# Patient Record
Sex: FEMALE | URBAN - NONMETROPOLITAN AREA
[De-identification: names, ages, dates, MRNs, and addresses within clinical notes are randomized per-mention and may not be internally consistent; named-entity substitution may affect disease eponyms.]

---

## 2018-09-13 ENCOUNTER — APPOINTMENT (RX ONLY)
Dept: URBAN - NONMETROPOLITAN AREA CLINIC 35 | Facility: CLINIC | Age: 28
Setting detail: DERMATOLOGY
End: 2018-09-13

## 2018-09-13 DIAGNOSIS — L71.8 OTHER ROSACEA: ICD-10-CM

## 2018-09-13 PROBLEM — L55.1 SUNBURN OF SECOND DEGREE: Status: ACTIVE | Noted: 2018-09-13

## 2018-09-13 PROBLEM — L70.0 ACNE VULGARIS: Status: ACTIVE | Noted: 2018-09-13

## 2018-09-13 PROBLEM — L85.3 XEROSIS CUTIS: Status: ACTIVE | Noted: 2018-09-13

## 2018-09-13 PROBLEM — L30.9 DERMATITIS, UNSPECIFIED: Status: ACTIVE | Noted: 2018-09-13

## 2018-09-13 PROCEDURE — ? TREATMENT REGIMEN

## 2018-09-13 PROCEDURE — ? IN-HOUSE DISPENSING PHARMACY

## 2018-09-13 PROCEDURE — 99201: CPT

## 2018-09-13 PROCEDURE — ? PRESCRIPTION

## 2018-09-13 PROCEDURE — ? OTHER

## 2018-09-13 RX ORDER — DOXYCYCLINE HYCLATE 50 MG/1
CAPSULE, GELATIN COATED ORAL
Qty: 120 | Refills: 1 | Status: CANCELLED
Stop reason: CLARIF

## 2018-09-13 RX ORDER — DOXYCYCLINE HYCLATE 100 MG/1
CAPSULE, GELATIN COATED ORAL
Qty: 60 | Refills: 1 | Status: ERX | COMMUNITY
Start: 2018-09-13

## 2018-09-13 RX ADMIN — DOXYCYCLINE HYCLATE: 100 CAPSULE, GELATIN COATED ORAL at 21:07

## 2018-09-13 ASSESSMENT — LOCATION ZONE DERM
LOCATION ZONE: NECK
LOCATION ZONE: TRUNK
LOCATION ZONE: FACE

## 2018-09-13 ASSESSMENT — LOCATION SIMPLE DESCRIPTION DERM
LOCATION SIMPLE: RIGHT ANTERIOR NECK
LOCATION SIMPLE: LEFT CHEEK
LOCATION SIMPLE: CHEST

## 2018-09-13 ASSESSMENT — LOCATION DETAILED DESCRIPTION DERM
LOCATION DETAILED: UPPER STERNUM
LOCATION DETAILED: LEFT INFERIOR CENTRAL MALAR CHEEK
LOCATION DETAILED: RIGHT INFERIOR ANTERIOR NECK

## 2018-09-13 NOTE — PROCEDURE: TREATMENT REGIMEN
Initiate Treatment: Azelaic acid BID\\nBland moisturizers\\nGentle cleansers \\nZinc SPF\\nNoncomedogenic products
Detail Level: Zone
Plan: Follow up in 6 weeks for evaluation of treatment, sooner prn

## 2018-09-13 NOTE — PROCEDURE: OTHER
Other (Free Text): Dr. Prabhjot Bustillo consulted.  He agrees with the above assessment and treatment plan.
Detail Level: Simple
Note Text (......Xxx Chief Complaint.): This diagnosis correlates with the

## 2018-09-13 NOTE — HPI: RASH
Is This A New Presentation, Or A Follow-Up?: Rash
Additional History: Pt has noticed these bumps off an on for a couple of years. Pt cannot detect any pattern or triggers. Pt thinks they have been more prevalent this summer. Bumps are not itchy and sometimes recede with sun exposure. Pt has an active lesion on the R chin. Pt changed her skincare products recently but changing products did not resolve the rash. \\nPt had a full skin check 2 years ago but does not have time for one today.

## 2018-09-13 NOTE — PROCEDURE: IN-HOUSE DISPENSING PHARMACY
Product 76 Refills: 0
Product 7 Amount/Unit (Numbers Only): 30
Product 55 Unit Type: mg
Product 24 Price/Unit (In Dollars): 45.00
Product 10 Price/Unit (In Dollars): 48.00
Name Of Product 16: Ivermec 1% / Metron 1% Rosacea Gel \\nIvermectin 1%/Metronidazole 1%/Niacinamide 4% /Potassium Azeloyl Diglycinate 5%
Name Of Product 15: Metron 1% Rosacea Gel \\nMetronidazole 1%/Niacinamide 4%
Product 6 Refills: 10
Product 18 Unit Type: ml
Product 4 Amount/Unit (Numbers Only): 60
Product 24 Refills: 3
Product 18 Price/Unit (In Dollars): 40.00
Name Of Product 21: Acne Rosacea Face & Body Cleanser \\nSodium Sulfacetamide 8%/Sulfur 4%
Product 10 Application Directions: Apply 1-2 pumps topically to the affected areas on legs twice daily for 3 weeks, breaking 1 week before repeating again
Product 13 Application Directions: Apply 1-2 pumps topically to affected areas twice daily
Product 22 Refills: 1
Name Of Product 1: Dap 6% Acne Gel\\nDapsone 6%/Niacinamide 4%
Name Of Product 19: Bruise Healing Cream \\nArnica 2%/Ascorbic Acid 20%/Bromelain 5% /Niacinamide 4%/Phytonadione 1%
Product 4 Application Directions: Apply to scalp daily
Product 12 Amount/Unit (Numbers Only): 120
Product 21 Price/Unit (In Dollars): 42.00
Name Of Product 22: AK Imiquim 5% / Levocetir 1% Gel \\nImiquimod 5%/Levocetirizine 1%/Niacinamide 2%
Send Charges To Patient Encounter: Yes
Product 23 Application Directions: Apply to upper lash line nightly
Product 21 Application Directions: Wet skin and apply to the affected areas. Massage gently for 10 seconds, working into a full lather. Rinse well and pat dry. 1-2 x daily.
Product 6 Application Directions: Apply topically to the affected nail(s) and surrounding areas one to two times daily as directed. May precipitate.
Product 12 Refills: 11
Name Of Product 8: Clob 0.05% Topical Solution \\nClobetasol Propionate 0.05%/Niacinamide 4%
Name Of Product 20: Tret 0.05% w/ HA\\nHyaluronic Acid 0.5%/Niacinamide 4%/Tretinoin 0.05%
Name Of Product 7: Anti-Fungal Econaz 1% Cream\\nEconazole Nitrate 1%/Niacinamide 4%
Name Of Product 12: Anti-Fungal Keto 2% Shampoo\\nKetoconazole 2%/Zinc Pyrithione 1%/Salicylic Acid 2%
Product 15 Application Directions: Apply 1-2 pumps topically to the face 1-2 x daily.
Name Of Product 14: Hydrocort 2.5%/Keto 2% Fungal Cream \\nHydrocortisone 2.5%/Ketoconazole 2%
Product 2 Application Directions: Apply 1-2 pumps topically to face every morning
Name Of Product 24: Azelaic Acid Cream\\nAzelaic Acid 15%/Niacinamide 4%
Name Of Product 6: Anti-Fungal Nail Solution \\nCiclopirox 8%/Fluconazole 1%/Terbinafine 1%
Name Of Product 17: Scar Silicone Gel \\nPentoxifylline 0.5%/Tranilast 1%/Triamcinolone Acetonide 0.1%/Zinc Oxide 5%
Name Of Product 2: Dapsone 6%/Niacinamide 2%/Spironolactone 5%
Product 22 Application Directions: Apply 1-2 pumps topically to the scalp twice daily Monday-Friday x 4 weeks.
Name Of Product 9: Clob 0.05% Ointment \\nClobetasol Propionate 0.05%/Niacinamide 4%
Name Of Product 23: Latisse
Name Of Product 3: BP 5% w/ Tret 0.025% Gel\\nBenzoyl Peroxide 5%/Niacinamide 2%/Tretinoin 0.025%
Product 6 Amount/Unit (Numbers Only): 15
Product 12 Application Directions: Apply to the scalp in the shower, letting sit for several minutes before rinsing off
Product 17 Application Directions: Apply 1-2 pumps topically to the affected areas one or two times daily for up to 3 weeks per month.
Detail Level: Simple
Name Of Product 4: Alopecia Topical Solution For Men HP\\nFinasteride 0.1%/Minoxidil 7%
Product 13 Price/Unit (In Dollars): 53.00
Name Of Product 10: Desox 0.025% Ointment\\nDesoximatasone 0.25%/Niacinamide 4%
Product 16 Refills: 7
Name Of Product 5: Alopecia Topical Solution For Women\\nMinoxidil 7%/Progesterone 0.1%
Product 20 Application Directions: Apply topically to face 2-7 nights per week as tolerated
Name Of Product 11: Mometasone 0.1% w/ HA\\nHyaluronic Acid 2%/Mometasone Furoate 0.1% /Niacinamide 4%
Name Of Product 18: Wart Gel \\nCimetidine 5%/Ibuprofen 2%/Salicylic Acid 17%
Product 9 Application Directions: AAA BID for 3 weeks on, 1 week off per month
Product 14 Application Directions: AAA 1-2 x daily
Product 18 Application Directions: Apply 1-2 pumps topically to affected areas nightly
Product 9 Unit Type: grams
Product 16 Application Directions: Apply 1-2 pumps topically to the face every night
Product 7 Application Directions: Apply to both feet and in between toes qhs
Product 24 Application Directions: Apply 1-2 pumps topically to affected areas on face twice daily
Name Of Product 13: Tacro 0.1% Ointment\\nNiacinamide 4%/Tacrolimus 0.1%
Product 5 Application Directions: Apply topically to the affected areas one time daily

## 2018-09-24 ENCOUNTER — RX ONLY (OUTPATIENT)
Age: 28
Setting detail: RX ONLY
End: 2018-09-24

## 2018-09-24 RX ORDER — FLUCONAZOLE 150 MG/1
TABLET ORAL
Qty: 2 | Refills: 0 | Status: CANCELLED

## 2018-09-25 ENCOUNTER — RX ONLY (OUTPATIENT)
Age: 28
Setting detail: RX ONLY
End: 2018-09-25

## 2018-09-25 RX ORDER — DOXYCYCLINE HYCLATE 20 MG/1
TABLET, FILM COATED ORAL
Qty: 60 | Refills: 1 | Status: ERX | COMMUNITY
Start: 2018-09-25

## 2018-11-05 ENCOUNTER — APPOINTMENT (RX ONLY)
Dept: URBAN - NONMETROPOLITAN AREA CLINIC 31 | Facility: CLINIC | Age: 28
Setting detail: DERMATOLOGY
End: 2018-11-05

## 2018-11-05 DIAGNOSIS — Q828 OTHER SPECIFIED ANOMALIES OF SKIN: ICD-10-CM

## 2018-11-05 DIAGNOSIS — Q826 OTHER SPECIFIED ANOMALIES OF SKIN: ICD-10-CM

## 2018-11-05 DIAGNOSIS — B36.8 OTHER SPECIFIED SUPERFICIAL MYCOSES: ICD-10-CM

## 2018-11-05 DIAGNOSIS — D22 MELANOCYTIC NEVI: ICD-10-CM

## 2018-11-05 DIAGNOSIS — Q819 OTHER SPECIFIED ANOMALIES OF SKIN: ICD-10-CM

## 2018-11-05 PROBLEM — L70.0 ACNE VULGARIS: Status: ACTIVE | Noted: 2018-11-05

## 2018-11-05 PROBLEM — D22.9 MELANOCYTIC NEVI, UNSPECIFIED: Status: ACTIVE | Noted: 2018-11-05

## 2018-11-05 PROBLEM — Q82.8 OTHER SPECIFIED CONGENITAL MALFORMATIONS OF SKIN: Status: ACTIVE | Noted: 2018-11-05

## 2018-11-05 PROCEDURE — ? COUNSELING

## 2018-11-05 PROCEDURE — ? PRESCRIPTION

## 2018-11-05 PROCEDURE — 99202 OFFICE O/P NEW SF 15 MIN: CPT

## 2018-11-05 RX ORDER — ECONAZOLE NITRATE 10 MG/G
CREAM TOPICAL
Qty: 1 | Refills: 0 | Status: ERX | COMMUNITY
Start: 2018-11-05

## 2018-11-05 RX ORDER — FLUCONAZOLE 150 MG/1
TABLET ORAL
Qty: 30 | Refills: 0 | Status: ERX | COMMUNITY
Start: 2018-11-05

## 2018-11-05 RX ADMIN — ECONAZOLE NITRATE: 10 CREAM TOPICAL at 00:00

## 2018-11-05 RX ADMIN — FLUCONAZOLE: 150 TABLET ORAL at 00:00

## 2018-11-08 ENCOUNTER — APPOINTMENT (RX ONLY)
Dept: URBAN - NONMETROPOLITAN AREA CLINIC 31 | Facility: CLINIC | Age: 28
Setting detail: DERMATOLOGY
End: 2018-11-08

## 2018-11-08 DIAGNOSIS — B36.8 OTHER SPECIFIED SUPERFICIAL MYCOSES: ICD-10-CM | Status: UNCHANGED

## 2018-11-08 PROCEDURE — ? OTHER

## 2018-11-08 NOTE — PROCEDURE: OTHER
Detail Level: Zone
Other (Free Text): MILD ERYTH NECK-CD FROM ECONAZOLE OR SELSUN BLUE SHAMPOO\\nD/C ALL TOPICALS AND RESUME ONE AT A TIME WHEN NECK CLEARS.
Note Text (......Xxx Chief Complaint.): This diagnosis correlates with the

## 2018-12-03 ENCOUNTER — APPOINTMENT (RX ONLY)
Dept: URBAN - NONMETROPOLITAN AREA CLINIC 31 | Facility: CLINIC | Age: 28
Setting detail: DERMATOLOGY
End: 2018-12-03

## 2018-12-03 DIAGNOSIS — B36.8 OTHER SPECIFIED SUPERFICIAL MYCOSES: ICD-10-CM

## 2018-12-03 PROCEDURE — ? PRESCRIPTION

## 2018-12-03 PROCEDURE — 99212 OFFICE O/P EST SF 10 MIN: CPT

## 2018-12-03 RX ORDER — ECONAZOLE NITRATE 10 MG/G
CREAM TOPICAL
Qty: 1 | Refills: 2 | Status: ERX

## 2018-12-03 RX ORDER — ECONAZOLE NITRATE 10 MG/G
CREAM TOPICAL
Qty: 1 | Refills: 0 | Status: ERX

## 2018-12-03 RX ORDER — FLUCONAZOLE 200 MG/1
TABLET ORAL
Qty: 60 | Refills: 0 | Status: ERX | COMMUNITY
Start: 2018-12-03

## 2018-12-03 RX ADMIN — FLUCONAZOLE: 200 TABLET ORAL at 16:43

## 2019-02-19 ENCOUNTER — APPOINTMENT (RX ONLY)
Dept: URBAN - NONMETROPOLITAN AREA CLINIC 31 | Facility: CLINIC | Age: 29
Setting detail: DERMATOLOGY
End: 2019-02-19

## 2019-02-19 DIAGNOSIS — B36.8 OTHER SPECIFIED SUPERFICIAL MYCOSES: ICD-10-CM

## 2019-02-19 PROCEDURE — ? PRESCRIPTION

## 2019-02-19 RX ORDER — FLUCONAZOLE 150 MG/1
TABLET ORAL
Qty: 30 | Refills: 0 | Status: ERX

## 2019-04-23 RX ORDER — FLUCONAZOLE 200 MG/1
TABLET ORAL
Qty: 60 | Refills: 0 | Status: ERX

## 2019-04-23 RX ORDER — ECONAZOLE NITRATE 10 MG/G
CREAM TOPICAL
Qty: 1 | Refills: 0 | Status: ERX